# Patient Record
Sex: FEMALE | Race: WHITE | Employment: UNEMPLOYED | URBAN - METROPOLITAN AREA
[De-identification: names, ages, dates, MRNs, and addresses within clinical notes are randomized per-mention and may not be internally consistent; named-entity substitution may affect disease eponyms.]

---

## 2021-06-02 ENCOUNTER — HOSPITAL ENCOUNTER (OUTPATIENT)
Dept: LAB | Age: 51
Discharge: HOME | End: 2021-06-02
Payer: OTHER GOVERNMENT

## 2021-06-02 ENCOUNTER — OFFICE VISIT (OUTPATIENT)
Dept: HEMATOLOGY | Age: 51
End: 2021-06-02
Payer: OTHER GOVERNMENT

## 2021-06-02 VITALS
OXYGEN SATURATION: 98 % | HEART RATE: 89 BPM | SYSTOLIC BLOOD PRESSURE: 124 MMHG | TEMPERATURE: 97.8 F | HEIGHT: 64 IN | DIASTOLIC BLOOD PRESSURE: 84 MMHG

## 2021-06-02 DIAGNOSIS — R74.8 ELEVATED LIVER ENZYMES: ICD-10-CM

## 2021-06-02 DIAGNOSIS — R74.8 ELEVATED LIVER ENZYMES: Primary | ICD-10-CM

## 2021-06-02 LAB
ALBUMIN SERPL-MCNC: 4.3 G/DL (ref 3.4–5)
ALBUMIN/GLOB SERPL: 1.2 {RATIO} (ref 0.8–1.7)
ALP SERPL-CCNC: 196 U/L (ref 45–117)
ALT SERPL-CCNC: 63 U/L (ref 13–56)
ANION GAP SERPL CALC-SCNC: 7 MMOL/L (ref 3–18)
AST SERPL-CCNC: 24 U/L (ref 10–38)
BASOPHILS # BLD: 0 K/UL (ref 0–0.1)
BASOPHILS NFR BLD: 1 % (ref 0–2)
BILIRUB DIRECT SERPL-MCNC: 0.1 MG/DL (ref 0–0.2)
BILIRUB SERPL-MCNC: 0.4 MG/DL (ref 0.2–1)
BUN SERPL-MCNC: 7 MG/DL (ref 7–18)
BUN/CREAT SERPL: 10 (ref 12–20)
CALCIUM SERPL-MCNC: 9.6 MG/DL (ref 8.5–10.1)
CHLORIDE SERPL-SCNC: 104 MMOL/L (ref 100–111)
CO2 SERPL-SCNC: 29 MMOL/L (ref 21–32)
CREAT SERPL-MCNC: 0.68 MG/DL (ref 0.6–1.3)
DIFFERENTIAL METHOD BLD: ABNORMAL
EOSINOPHIL # BLD: 0.1 K/UL (ref 0–0.4)
EOSINOPHIL NFR BLD: 3 % (ref 0–5)
ERYTHROCYTE [DISTWIDTH] IN BLOOD BY AUTOMATED COUNT: 13.5 % (ref 11.6–14.5)
FERRITIN SERPL-MCNC: 33 NG/ML (ref 8–388)
GLOBULIN SER CALC-MCNC: 3.5 G/DL (ref 2–4)
GLUCOSE SERPL-MCNC: 86 MG/DL (ref 74–99)
HCT VFR BLD AUTO: 43 % (ref 35–45)
HGB BLD-MCNC: 13.7 G/DL (ref 12–16)
INR PPP: 1 (ref 0.8–1.2)
IRON SATN MFR SERPL: 13 % (ref 20–50)
IRON SERPL-MCNC: 58 UG/DL (ref 50–175)
LYMPHOCYTES # BLD: 1.7 K/UL (ref 0.9–3.6)
LYMPHOCYTES NFR BLD: 41 % (ref 21–52)
MCH RBC QN AUTO: 29.5 PG (ref 24–34)
MCHC RBC AUTO-ENTMCNC: 31.9 G/DL (ref 31–37)
MCV RBC AUTO: 92.5 FL (ref 74–97)
MONOCYTES # BLD: 0.3 K/UL (ref 0.05–1.2)
MONOCYTES NFR BLD: 8 % (ref 3–10)
NEUTS SEG # BLD: 2 K/UL (ref 1.8–8)
NEUTS SEG NFR BLD: 47 % (ref 40–73)
PLATELET # BLD AUTO: 366 K/UL (ref 135–420)
PMV BLD AUTO: 10.3 FL (ref 9.2–11.8)
POTASSIUM SERPL-SCNC: 3.7 MMOL/L (ref 3.5–5.5)
PROT SERPL-MCNC: 7.8 G/DL (ref 6.4–8.2)
PROTHROMBIN TIME: 13 SEC (ref 11.5–15.2)
RBC # BLD AUTO: 4.65 M/UL (ref 4.2–5.3)
SODIUM SERPL-SCNC: 140 MMOL/L (ref 136–145)
TIBC SERPL-MCNC: 445 UG/DL (ref 250–450)
WBC # BLD AUTO: 4.2 K/UL (ref 4.6–13.2)

## 2021-06-02 PROCEDURE — 99204 OFFICE O/P NEW MOD 45 MIN: CPT | Performed by: INTERNAL MEDICINE

## 2021-06-02 PROCEDURE — 80048 BASIC METABOLIC PNL TOTAL CA: CPT

## 2021-06-02 PROCEDURE — 83516 IMMUNOASSAY NONANTIBODY: CPT

## 2021-06-02 PROCEDURE — 86256 FLUORESCENT ANTIBODY TITER: CPT

## 2021-06-02 PROCEDURE — 82390 ASSAY OF CERULOPLASMIN: CPT

## 2021-06-02 PROCEDURE — 83540 ASSAY OF IRON: CPT

## 2021-06-02 PROCEDURE — 80076 HEPATIC FUNCTION PANEL: CPT

## 2021-06-02 PROCEDURE — 85610 PROTHROMBIN TIME: CPT

## 2021-06-02 PROCEDURE — 36415 COLL VENOUS BLD VENIPUNCTURE: CPT

## 2021-06-02 PROCEDURE — 82103 ALPHA-1-ANTITRYPSIN TOTAL: CPT

## 2021-06-02 PROCEDURE — 85025 COMPLETE CBC W/AUTO DIFF WBC: CPT

## 2021-06-02 PROCEDURE — 86038 ANTINUCLEAR ANTIBODIES: CPT

## 2021-06-02 PROCEDURE — 82728 ASSAY OF FERRITIN: CPT

## 2021-06-02 RX ORDER — DEXTROAMPHETAMINE SACCHARATE, AMPHETAMINE ASPARTATE, DEXTROAMPHETAMINE SULFATE AND AMPHETAMINE SULFATE 5; 5; 5; 5 MG/1; MG/1; MG/1; MG/1
20 TABLET ORAL
COMMUNITY
End: 2021-10-13

## 2021-06-02 RX ORDER — TRAZODONE HYDROCHLORIDE 50 MG/1
TABLET ORAL
COMMUNITY

## 2021-06-02 RX ORDER — OMEPRAZOLE 40 MG/1
40 CAPSULE, DELAYED RELEASE ORAL DAILY
COMMUNITY

## 2021-06-02 RX ORDER — HYDROXYCHLOROQUINE SULFATE 200 MG/1
200 TABLET, FILM COATED ORAL DAILY
COMMUNITY

## 2021-06-02 NOTE — Clinical Note
6/12/2021 Patient: Kendal Meza YOB: 1970 Date of Visit: 6/2/2021 [address]  [phone] [phone] Via Fax: [phone] Dear Nicol Ray PA-C, Thank you for referring Ms. Kendal Meza to [address] for evaluation. My notes for this consultation are attached. If you have questions, please do not hesitate to call me. I look forward to following your patient along with you. Sincerely, Ladi Sebastian MD

## 2021-06-02 NOTE — PROGRESS NOTES
Karynaue Dill [address]      Gregor Schmitz MD, Therese, Saman Dubon, Sybil Cummins MD, MPH      Blake Núñez, PA-ALEX Jeff, [facility]     Melanie Parsons, [facility]   Silvia Shaw, P-C    Rui Longo, [facility]       [facility] [address]    at [address], [address] [place] [address], [facility] 22.    [phone]    FAX: [address] [place] [address]    at [facility]    [address], [address], [address]    [phone]    FAX: [phone]         Patient Care Team:  Carlos Guillen as PCP - General (Physician Assistant)  Annie West MD (Gastroenterology)      Problem List  Date Reviewed: 6/12/2021        Codes Class Noted    Elevated liver enzymes ICD-10-CM: R74.8  ICD-9-CM: 790.5  6/12/2021        Fibromyalgia ICD-10-CM: M79.7  ICD-9-CM: 729.1  6/12/2021        Systemic lupus ([facility] [address] ICD-10-CM: M32.9  ICD-9-CM: 710.0  6/12/2021        ADD (attention deficit disorder) ICD-10-CM: F98.8  ICD-9-CM: 314.00  6/12/2021        GERD (gastroesophageal reflux disease) ICD-10-CM: K21.9  ICD-9-CM: 530.81  6/12/2021        History of cholecystectomy ICD-10-CM: Z90.49  ICD-9-CM: V45.79  6/12/2021        H/O lumbosacral spine surgery ICD-10-CM: Z98.890  ICD-9-CM: V15.29  6/12/2021              The clinicians listed above have asked me to see Susan Posey in consultation regarding elevated liver enzymes and its management. All medical records sent by the referring physicians were reviewed including imaging studies and pathology. The patient is a 44 y.o. female who was found to have elevated liver transaminases in 11/2020.       Serologic evaluation for markers of chronic liver disease was positive or CARLENE    Imaging of the liver   was either not performed or the results are not available to me.    was not performed. CT scan of the liver was performed in 2/2021. The results of the imaging demonstrated a normal appearing liver. A liver biopsy was performed in 3/2021. This demonstrated portal inflammation. The degree of fibrosis is not reported      The patient has the following symptoms which could be attributed to the liver disorder:    fatigue,   nausea,   arthralgias,   myalgias,     The patient is not experiencing the following symptoms which are commonly seen in this liver disorder:   vomiting,   swelling of the abdomen,   swelling of the lower extremities,   hematemesis,   hematochezia. The patient has Severe limitations in functional activities which can be attributed to other medical problems that are not related to the liver disease. ASSESSMENT AND PLAN:  Elevated liver enzymes  Persistent elevation in liver transaminases of unclear etiology at this time. ALP is normal.  Liver function is normal.  The platelet count is normal.      Serologic testing for causes of chronic liver disease was negative     Will perform laboratory testing to monitor liver function and degree of liver injury. This included BMP, hepatic panel, CBC with platelet count, INR. The patient had a liver biopsy   Will request that the liver biopsy slides sent be to me for my personal review. Screening for Hepatocellular Carcinoma  HCC screening is not necessary if the patient has no evidence of cirrhosis. Treatment of other medical problems in patients with chronic liver disease  There are no contraindications for the patient to take most medications that are necessary for treatment of other medical issues. Counseling for alcohol in patients with chronic liver disease  The patient was counseled regarding alcohol consumption and the effect of alcohol on chronic liver disease. The patient does not consume any significant amount of alcohol.     Vaccinations   The need for vaccination against viral hepatitis A and B will be assessed with serologic and instituted as appropriate. Routine vaccinations against other bacterial and viral agents can be performed as indicated. Annual flu vaccination should be administered if indicated. ALLERGIES  Allergies   Allergen Reactions    Ampicillin Nausea and Vomiting    Azithromycin Nausea and Vomiting    Demerol [Meperidine] Hives    Tramadol Hives       MEDICATIONS  Current Outpatient Medications   Medication Sig    omeprazole (PRILOSEC) 40 mg capsule Take 40 mg by mouth daily.  hydrOXYchloroQUINE (PLAQUENIL) 200 mg tablet Take 200 mg by mouth daily.  traZODone (DESYREL) 50 mg tablet Take  by mouth nightly.  dextroamphetamine-amphetamine (AdderalL) 20 mg tablet Take 20 mg by mouth. No current facility-administered medications for this visit. SYSTEM REVIEW NOT RELATED TO LIVER DISEASE OR REVIEWED ABOVE:  Constitution systems: Negative for fever, chills, weight gain, weight loss. Eyes: Negative for visual changes. ENT: Negative for sore throat, painful swallowing. Respiratory: Negative for cough, hemoptysis, SOB. Cardiology: Negative for chest pain, palpitations. GI:  Negative for constipation or diarrhea. : Negative for urinary frequency, dysuria, hematuria, nocturia. Skin: Negative for rash. Hematology: Negative for easy bruising, blood clots. Musculo-skelatal: Negative for back pain, muscle pain, weakness. Neurologic: Negative for headaches, dizziness, vertigo, memory problems not related to HE. Psychology: Negative for anxiety, depression. FAMILY HISTORY:  The father  of Agent [place]. The mother Has/had the following chronic disease(s): NOne. There is no family history of liver disease. SOCIAL HISTORY:  The patient is . The patient has 3 children,   The patient stopped using tobacco products in . The patient does not work outside the home.         PHYSICAL EXAMINATION:  Visit Vitals  /84   Pulse 89   Temp 97.8 °F (36.6 °C) (Tympanic)   Ht 5' 4\" (1.626 m)   SpO2 98%     General: No acute distress. Eyes: Sclera anicteric. ENT: No oral lesions. Thyroid normal.  Nodes: No adenopathy. Skin: No spider angiomata. No jaundice. No palmar erythema. Respiratory: Lungs clear to auscultation. Cardiovascular: Regular heart rate. No murmurs. No JVD. Abdomen: Soft non-tender. Liver size normal to percussion/palpation. Spleen not palpable. No obvious ascites. Extremities: No edema. No muscle wasting. No gross arthritic changes. Neurologic: Alert and oriented. Cranial nerves grossly intact. No asterixis. LABORATORY STUDIES:  Liver [place] of [address] Units 6/2/2021   WBC 4.6 - 13.2 K/uL 4.2 (L)   ANC 1.8 - 8.0 K/UL 2.0   HGB 12.0 - 16.0 g/dL 13.7    - 420 K/uL 366   INR 0.8 - 1.2   1.0   AST 10 - 38 U/L 24   ALT 13 - 56 U/L 63 (H)   Alk Phos 45 - 117 U/L 196 (H)   Bili, Total 0.2 - 1.0 MG/DL 0.4   Bili, Direct 0.0 - 0.2 MG/DL 0.1   Albumin 3.4 - 5.0 g/dL 4.3   BUN 7.0 - 18 MG/DL 7   Creat 0.6 - 1.3 MG/DL 0.68   Na 136 - 145 mmol/L 140   K 3.5 - 5.5 mmol/L 3.7   Cl 100 - 111 mmol/L 104   CO2 21 - 32 mmol/L 29   Glucose 74 - 99 mg/dL 86     SEROLOGIES:  11/2020. HBsAntigen negative, anti-HCV negative, CARLENE positive,    Serologies Latest Ref Rng & Units 6/2/2021   Ferritin 8 - 388 NG/ML 33   Iron % Saturation 20 - 50 % 13 (L)   CARLENE, IFA  Negative   C-ANCA Neg:<1:20 titer <1:20   P-ANCA Neg:<1:20 titer <1:20   ANCA Neg:<1:20 titer <1:20   ASMCA 0 - 19 Units 8   M2 Ab 0.0 - 20.0 Units <20.0   Ceruloplasmin 19.0 - 39.0 mg/dL 29.7   Alpha-1 antitrypsin level 101 - 187 mg/dL 130     LIVER HISTOLOGY:  Not available or performed    ENDOSCOPIC PROCEDURES:  Not available or performed    RADIOLOGY:  2/2021. CT scan abdomen with IV contrast.  Normal appearing liver. No liver mass lesions. Normal spleen. No ascites.     OTHER TESTING:  Not available or performed    FOLLOW-UP:  All of the issues listed above in the Assessment and Plan were discussed with the patient. All questions were answered. The patient expressed a clear understanding of the above. [address] [address] in 4 weeks for Fibroscan to review all data and determine the treatment plan.       El Ott MD  [address] [facility]  4 [facility], [address] [place] [address] Yanira George, [address] [facility] [address]  [address] [place] [address]

## 2021-06-03 LAB
A1AT SERPL-MCNC: 130 MG/DL (ref 101–187)
ACTIN IGG SERPL-ACNC: 8 UNITS (ref 0–19)
ANA TITR SER IF: NEGATIVE {TITER}
CERULOPLASMIN SERPL-MCNC: 29.7 MG/DL (ref 19–39)
MITOCHONDRIA M2 IGG SER-ACNC: <20 UNITS (ref 0–20)

## 2021-06-04 LAB
C-ANCA TITR SER IF: NORMAL TITER
P-ANCA ATYPICAL TITR SER IF: NORMAL TITER
P-ANCA TITR SER IF: NORMAL TITER

## 2021-06-12 PROBLEM — K21.9 GERD (GASTROESOPHAGEAL REFLUX DISEASE): Status: ACTIVE | Noted: 2021-06-12

## 2021-06-12 PROBLEM — M32.9 SYSTEMIC LUPUS (HCC): Status: ACTIVE | Noted: 2021-06-12

## 2021-06-12 PROBLEM — M79.7 FIBROMYALGIA: Status: ACTIVE | Noted: 2021-06-12

## 2021-06-12 PROBLEM — F98.8 ADD (ATTENTION DEFICIT DISORDER): Status: ACTIVE | Noted: 2021-06-12

## 2021-06-12 PROBLEM — R74.8 ELEVATED LIVER ENZYMES: Status: ACTIVE | Noted: 2021-06-12

## 2021-06-12 PROBLEM — Z90.49 HISTORY OF CHOLECYSTECTOMY: Status: ACTIVE | Noted: 2021-06-12

## 2021-06-12 PROBLEM — Z98.890 H/O LUMBOSACRAL SPINE SURGERY: Status: ACTIVE | Noted: 2021-06-12

## 2021-09-22 ENCOUNTER — OFFICE VISIT (OUTPATIENT)
Dept: HEMATOLOGY | Age: 51
End: 2021-09-22
Payer: OTHER GOVERNMENT

## 2021-09-22 VITALS
HEART RATE: 63 BPM | DIASTOLIC BLOOD PRESSURE: 63 MMHG | BODY MASS INDEX: 26.16 KG/M2 | OXYGEN SATURATION: 96 % | SYSTOLIC BLOOD PRESSURE: 106 MMHG | TEMPERATURE: 97.4 F | WEIGHT: 152.38 LBS

## 2021-09-22 DIAGNOSIS — K75.4 AUTOIMMUNE HEPATITIS (HCC): Primary | ICD-10-CM

## 2021-09-22 PROCEDURE — 99214 OFFICE O/P EST MOD 30 MIN: CPT | Performed by: INTERNAL MEDICINE

## 2021-09-22 RX ORDER — BUPRENORPHINE 5 UG/H
PATCH TRANSDERMAL
COMMUNITY

## 2021-09-22 NOTE — Clinical Note
10/13/2021    Patient: Graima Germain   YOB: 1970   Date of Visit: 9/22/2021     Niya Lopez [address] [facility] [address]  Via Fax: [phone]    Dear Eusebio Perez PA-C,      Thank you for referring Ms. Garima Germain to [address] [place] [address],[address] for evaluation. My notes for this consultation are attached. If you have questions, please do not hesitate to call me. I look forward to following your patient along with you.       Sincerely,    Yonas Bloom MD

## 2021-09-22 NOTE — PROGRESS NOTES
Sandor Flores MD, [address], [address] Whitney Dubon, Van Chairez MD, MPH      APOLINAR Carbajal, ACNP-BC     Melanie Parsons, [facility]   David Councilman, FNP-C    Ani Matias, [facility]       [facility]ile [facility] [address] [place] [address]    at [address], [address] [place] [address], [facility] 22.    [phone]    FAX: [address] [place] [address] Avenue    [facility]    [address], [address]    [address] [facility], [address]    [phone]    FAX: [phone]         Patient Care Team:  Alexx Medina as PCP - General (Physician Assistant)  Emanuel Kerr MD (Gastroenterology)      Problem List  Date Reviewed: 6/12/2021        Codes Class Noted    Elevated liver enzymes ICD-10-CM: R74.8  ICD-9-CM: 790.5  6/12/2021        Fibromyalgia ICD-10-CM: M79.7  ICD-9-CM: 729.1  6/12/2021        Systemic lupus ([address] ICD-10-CM: M32.9  ICD-9-CM: 710.0  6/12/2021        ADD (attention deficit disorder) ICD-10-CM: F98.8  ICD-9-CM: 314.00  6/12/2021        GERD (gastroesophageal reflux disease) ICD-10-CM: K21.9  ICD-9-CM: 530.81  6/12/2021        History of cholecystectomy ICD-10-CM: Z90.49  ICD-9-CM: V45.79  6/12/2021        H/O lumbosacral spine surgery ICD-10-CM: Z98.890  ICD-9-CM: V15.29  6/12/2021                Marisol Youssef is being seen at [address] for management of elevated liver enzymes. The active problem list, all pertinent past medical history, medications, radiologic findings and laboratory findings related to the liver disorder were reviewed and discussed with the patient. The patient is a 44 y.o. female who was found to have elevated liver transaminases in 11/2020.       Serologic evaluation for markers of chronic liver disease was positive or CARLENE    CT scan of the liver was performed in 2/2021. The results of the imaging demonstrated a normal appearing liver. Since the last appointment I have since received, reviewed and interpreted the patient's liver biopsy slides from Denzel HAYNES performed in 3/2021. This demonstrates mild portal inflammation, no interface inflammation, no PMN, mild lobular inflammation, normal bile ducts, no fibrosis. Assessment of liver fibrosis with Fibroscan was performed in the office today. The result was 3.4 kPa which correlates with no fibrosis. The patient has the following symptoms which could be attributed to the liver disorder:    fatigue,   nausea,   arthralgias,   myalgias,     The patient is not experiencing the following symptoms which are commonly seen in this liver disorder:   vomiting,   swelling of the abdomen,   swelling of the lower extremities,   hematemesis,   hematochezia. The patient has Severe limitations in functional activities which can be attributed to other medical problems that are not related to the liver disease. ASSESSMENT AND PLAN:  SLE Associated Hepatitis   The diagnosis was based upon laboratory studies, serology, liver biopsy, other coexistant autoimmune disorders    A liver biopsy performed in 3/2021 shows mild inflammation, no interface hepatitis, no piecemeal necrosis, normal bile ducts, mild lobular inflammation and no fibrosis    Fibroscan performed in 9/2021 demonstrated a liver stiffness of 3.4 consistent with no fibrosis    The patient is not currently receiving treatment for the liver disease. Autoimmune associated hepatitis can be seen in many rheumatologic disorders. As opposed to true autoimmune hepatitis the autoimmune associated hepatitis is always mild, does not cause fibrosis and in the majority of cases does not progress to cirrhosis.         Liver transaminases are normal.  ALP is normal.  Liver function is normal.  The platelet count is   normal.      The Fibroscan can be repeated annually or as often as clinically indicated to assess for fibrosis progression and/or regression. Screening for Hepatocellular Carcinoma  HCC screening is not necessary if the patient has no evidence of cirrhosis. Treatment of other medical problems in patients with chronic liver disease  There are no contraindications for the patient to take most medications that are necessary for treatment of other medical issues. Counseling for alcohol in patients with chronic liver disease  The patient was counseled regarding alcohol consumption and the effect of alcohol on chronic liver disease. The patient does not consume any significant amount of alcohol. Vaccinations   The need for vaccination against viral hepatitis A and B will be assessed with serologic and instituted as appropriate. Routine vaccinations against other bacterial and viral agents can be performed as indicated. Annual flu vaccination should be administered if indicated. ALLERGIES  Allergies   Allergen Reactions    Ampicillin Nausea and Vomiting    Azithromycin Nausea and Vomiting    Demerol [Meperidine] Hives    Tramadol Hives       MEDICATIONS  Current Outpatient Medications   Medication Sig    buprenorphine (BUTRANS) 5 mcg/hour patch buprenorphine 5 mcg/hour weekly transdermal patch   Apply 1 patch every week by transdermal route for 30 days.  omeprazole (PRILOSEC) 40 mg capsule Take 40 mg by mouth daily.  hydrOXYchloroQUINE (PLAQUENIL) 200 mg tablet Take 200 mg by mouth daily.  traZODone (DESYREL) 50 mg tablet Take  by mouth nightly.  dextroamphetamine-amphetamine (AdderalL) 20 mg tablet Take 20 mg by mouth. (Patient not taking: Reported on 9/22/2021)     No current facility-administered medications for this visit. SYSTEM REVIEW NOT RELATED TO LIVER DISEASE OR REVIEWED ABOVE:  Constitution systems: Negative for fever, chills, weight gain, weight loss. Eyes: Negative for visual changes.   ENT: Negative for sore throat, painful swallowing. Respiratory: Negative for cough, hemoptysis, SOB. Cardiology: Negative for chest pain, palpitations. GI:  Negative for constipation or diarrhea. : Negative for urinary frequency, dysuria, hematuria, nocturia. Skin: Negative for rash. Hematology: Negative for easy bruising, blood clots. Musculo-skelatal: Negative for back pain, muscle pain, weakness. Neurologic: Negative for headaches, dizziness, vertigo, memory problems not related to HE. Psychology: Negative for anxiety, depression. FAMILY HISTORY:  The father  of Agent [place]. The mother Has/had the following chronic disease(s): NOne. There is no family history of liver disease. SOCIAL HISTORY:  The patient is . The patient has 3 children,   The patient stopped using tobacco products in . The patient does not work outside the home. PHYSICAL EXAMINATION:  Visit Vitals  /63   Pulse 63   Temp 97.4 °F (36.3 °C) (Tympanic)   Wt 152 lb 6 oz (69.1 kg)   SpO2 96%   BMI 26.16 kg/m²     General: No acute distress. Eyes: Sclera anicteric. ENT: No oral lesions. Thyroid normal.  Nodes: No adenopathy. Skin: No spider angiomata. No jaundice. No palmar erythema. Respiratory: Lungs clear to auscultation. Cardiovascular: Regular heart rate. No murmurs. No JVD. Abdomen: Soft non-tender. Liver size normal to percussion/palpation. Spleen not palpable. No obvious ascites. Extremities: No edema. No muscle wasting. No gross arthritic changes. Neurologic: Alert and oriented. Cranial nerves grossly intact. No asterixis.     LABORATORY STUDIES:  Liver [place] of [address] Units 2021   WBC 4.6 - 13.2 K/uL 4.2 (L)   ANC 1.8 - 8.0 K/UL 2.0   HGB 12.0 - 16.0 g/dL 13.7    - 420 K/uL 366   INR 0.8 - 1.2   1.0   AST 10 - 38 U/L 24   ALT 13 - 56 U/L 63 (H)   Alk Phos 45 - 117 U/L 196 (H)   Bili, Total 0.2 - 1.0 MG/DL 0.4   Bili, Direct 0.0 - 0.2 MG/DL 0.1 Albumin 3.4 - 5.0 g/dL 4.3   BUN 7.0 - 18 MG/DL 7   Creat 0.6 - 1.3 MG/DL 0.68   Na 136 - 145 mmol/L 140   K 3.5 - 5.5 mmol/L 3.7   Cl 100 - 111 mmol/L 104   CO2 21 - 32 mmol/L 29   Glucose 74 - 99 mg/dL 86     SEROLOGIES:  11/2020. HBsAntigen negative, anti-HCV negative, CARLENE positive,    Serologies Latest Ref Rng & Units 6/2/2021   Ferritin 8 - 388 NG/ML 33   Iron % Saturation 20 - 50 % 13 (L)   CARLENE, IFA  Negative   C-ANCA Neg:<1:20 titer <1:20   P-ANCA Neg:<1:20 titer <1:20   ANCA Neg:<1:20 titer <1:20   ASMCA 0 - 19 Units 8   M2 Ab 0.0 - 20.0 Units <20.0   Ceruloplasmin 19.0 - 39.0 mg/dL 29.7   Alpha-1 antitrypsin level 101 - 187 mg/dL 130     LIVER HISTOLOGY:  3/2021. Slides reviewed by MLS. MIld portal inflammation, with no interface hepatitis, with no PMN, normal bile ducts, mild lobular inflammation. no steatosis. Cheryl stage 1 fibrosis. Metavir stage 0 fibrosis. Knodell score (0110). 9/2021. FibroScan performed at [address]. EkPa was 3.4. IQR/med 27%. . The results suggested a fibrosis level of F0. The CAP score suggests there is no significant hepatic steatosis. ENDOSCOPIC PROCEDURES:  Not available or performed    RADIOLOGY:  2/2021. CT scan abdomen with IV contrast.  Normal appearing liver. No liver mass lesions. Normal spleen. No ascites. OTHER TESTING:  Not available or performed    FOLLOW-UP:  All of the issues listed above in the Assessment and Plan were discussed with the patient. All questions were answered. The patient expressed a clear understanding of the above. [address] [address] in 6 months to review all data and determine the treatment plan.       Juliann Swan MD  [address] [address], [address] [place] [address], [address]  [address] [place] [address]

## 2021-10-22 PROBLEM — M48.061 LUMBAR STENOSIS: Status: ACTIVE | Noted: 2021-10-22

## 2022-03-18 PROBLEM — F98.8 ADD (ATTENTION DEFICIT DISORDER): Status: ACTIVE | Noted: 2021-06-12

## 2022-03-18 PROBLEM — M79.7 FIBROMYALGIA: Status: ACTIVE | Noted: 2021-06-12

## 2022-03-19 PROBLEM — M48.061 LUMBAR STENOSIS: Status: ACTIVE | Noted: 2021-10-22

## 2022-03-19 PROBLEM — Z90.49 HISTORY OF CHOLECYSTECTOMY: Status: ACTIVE | Noted: 2021-06-12

## 2022-03-19 PROBLEM — R74.8 ELEVATED LIVER ENZYMES: Status: ACTIVE | Noted: 2021-06-12

## 2022-03-19 PROBLEM — K21.9 GERD (GASTROESOPHAGEAL REFLUX DISEASE): Status: ACTIVE | Noted: 2021-06-12

## 2022-03-20 PROBLEM — Z98.890 H/O LUMBOSACRAL SPINE SURGERY: Status: ACTIVE | Noted: 2021-06-12

## 2022-03-20 PROBLEM — M32.9 SYSTEMIC LUPUS (HCC): Status: ACTIVE | Noted: 2021-06-12

## 2023-10-04 PROBLEM — M51.26 HNP (HERNIATED NUCLEUS PULPOSUS), LUMBAR: Status: ACTIVE | Noted: 2023-10-04

## 2024-06-03 ENCOUNTER — TELEPHONE (OUTPATIENT)
Age: 54
End: 2024-06-03

## 2024-06-20 PROBLEM — K85.90 POST-ERCP ACUTE PANCREATITIS: Status: ACTIVE | Noted: 2024-06-20

## 2024-06-20 PROBLEM — K91.89 POST-ERCP ACUTE PANCREATITIS: Status: ACTIVE | Noted: 2024-06-20

## 2024-08-06 RX ORDER — PREGABALIN 75 MG/1
75 CAPSULE ORAL 2 TIMES DAILY
COMMUNITY
Start: 2022-04-27

## 2024-08-06 RX ORDER — CODEINE PHOSPHATE AND GUAIFENESIN 10; 100 MG/5ML; MG/5ML
SOLUTION ORAL
COMMUNITY
Start: 2024-05-07 | End: 2024-09-01

## 2024-08-06 RX ORDER — ONDANSETRON 4 MG/1
4 TABLET, FILM COATED ORAL EVERY 8 HOURS PRN
COMMUNITY
Start: 2023-08-08

## 2024-08-06 RX ORDER — ONDANSETRON 4 MG/1
4 TABLET, ORALLY DISINTEGRATING ORAL EVERY 8 HOURS PRN
COMMUNITY
Start: 2024-07-10

## 2024-08-06 RX ORDER — DEXTROMETHORPHAN HYDROBROMIDE AND PROMETHAZINE HYDROCHLORIDE 15; 6.25 MG/5ML; MG/5ML
SYRUP ORAL
COMMUNITY
Start: 2024-04-23 | End: 2024-09-01

## 2024-08-06 RX ORDER — FLUTICASONE PROPIONATE 110 UG/1
2 AEROSOL, METERED RESPIRATORY (INHALATION) 2 TIMES DAILY
COMMUNITY
Start: 2023-01-10 | End: 2024-09-01

## 2024-08-06 RX ORDER — PREDNISONE 50 MG/1
TABLET ORAL
COMMUNITY
Start: 2024-05-07 | End: 2024-09-01

## 2024-08-06 RX ORDER — HYDROXYZINE HYDROCHLORIDE 25 MG/1
TABLET, FILM COATED ORAL
COMMUNITY
Start: 2023-11-13 | End: 2024-09-01

## 2024-08-06 RX ORDER — FLUOCINOLONE ACETONIDE 0.11 MG/ML
1 OIL AURICULAR (OTIC) 2 TIMES DAILY
COMMUNITY
Start: 2021-12-30 | End: 2024-09-01

## 2024-08-06 RX ORDER — LIDOCAINE 50 MG/G
PATCH TOPICAL
COMMUNITY
Start: 2020-06-04 | End: 2024-09-01

## 2024-08-06 RX ORDER — OXYCODONE HYDROCHLORIDE 5 MG/1
TABLET ORAL
COMMUNITY
Start: 2024-05-23 | End: 2024-09-01

## 2024-08-06 RX ORDER — MELOXICAM 15 MG/1
TABLET ORAL
COMMUNITY
Start: 2024-03-19 | End: 2024-09-01

## 2024-08-06 RX ORDER — PREDNISONE 20 MG/1
TABLET ORAL
COMMUNITY
Start: 2024-04-23 | End: 2024-09-01

## 2024-08-06 RX ORDER — KETOCONAZOLE 20 MG/G
1 CREAM TOPICAL DAILY
COMMUNITY
Start: 2023-09-26 | End: 2024-09-01

## 2024-08-06 RX ORDER — SODIUM, POTASSIUM,MAG SULFATES 17.5-3.13G
SOLUTION, RECONSTITUTED, ORAL ORAL
COMMUNITY
End: 2024-09-01

## 2024-08-06 RX ORDER — METHYLPHENIDATE HYDROCHLORIDE 18 MG/1
18 TABLET, EXTENDED RELEASE ORAL DAILY
COMMUNITY
Start: 2022-08-23 | End: 2024-09-01

## 2024-08-07 ENCOUNTER — OFFICE VISIT (OUTPATIENT)
Age: 54
End: 2024-08-07
Payer: MEDICARE

## 2024-08-07 VITALS
WEIGHT: 142 LBS | HEIGHT: 64 IN | OXYGEN SATURATION: 96 % | SYSTOLIC BLOOD PRESSURE: 109 MMHG | HEART RATE: 76 BPM | TEMPERATURE: 97.1 F | DIASTOLIC BLOOD PRESSURE: 74 MMHG | BODY MASS INDEX: 24.24 KG/M2

## 2024-08-07 DIAGNOSIS — K75.4 AUTOIMMUNE HEPATITIS (HCC): Primary | ICD-10-CM

## 2024-08-07 PROCEDURE — G8420 CALC BMI NORM PARAMETERS: HCPCS | Performed by: INTERNAL MEDICINE

## 2024-08-07 PROCEDURE — 3017F COLORECTAL CA SCREEN DOC REV: CPT | Performed by: INTERNAL MEDICINE

## 2024-08-07 PROCEDURE — 99214 OFFICE O/P EST MOD 30 MIN: CPT | Performed by: INTERNAL MEDICINE

## 2024-08-07 PROCEDURE — G8427 DOCREV CUR MEDS BY ELIG CLIN: HCPCS | Performed by: INTERNAL MEDICINE

## 2024-08-07 PROCEDURE — 1036F TOBACCO NON-USER: CPT | Performed by: INTERNAL MEDICINE

## 2024-08-07 PROCEDURE — 91200 LIVER ELASTOGRAPHY: CPT | Performed by: INTERNAL MEDICINE

## 2024-08-07 RX ORDER — PREDNISONE 5 MG/1
TABLET ORAL
COMMUNITY
Start: 2024-06-24 | End: 2024-09-01

## 2024-08-07 RX ORDER — METHOCARBAMOL 500 MG/1
TABLET, FILM COATED ORAL
COMMUNITY
Start: 2024-07-11 | End: 2024-09-01

## 2024-08-07 NOTE — PROGRESS NOTES
[facility]      Rell Torres MD, FACP, FACG, FAASLD    Armando Conner MD, MPH      TUTU Masterson, [facility]-BC     April S Gumaro, [facility]   Narendra Choudhury FNP-C    Michelle Evans, [facility]    at [facility]    [address] [facility], [address]    [place]  [address] [phone]    FAX: [phone]   [facility]    at [facility]    [address] [facility], [address]    [place]  [address] [phone]    FAX: [phone]       Patient Care Team:  Nia Baires PA-C as PCP - General (Physician Assistant)  Kareen Quinones MD (Gastroenterology)      Patient Active Problem List   Diagnosis    Fibromyalgia    ADD (attention deficit disorder)    Lumbar stenosis    History of cholecystectomy    Elevated liver enzymes    GERD (gastroesophageal reflux disease)    Systemic lupus (HCC)    H/O lumbosacral spine surgery    HNP (herniated nucleus pulposus), lumbar    Post-ERCP acute pancreatitis       Carmen Joy is being seen at [facility] for management of autoimmune associated hepatitis.      The active problem list, all pertinent past medical history, medications, radiologic findings and laboratory findings related to the liver disorder were reviewed and discussed with the patient.      This is the first appointment at [facility] since 9/2021.    The patient is a 50 y.o. female who was found to have elevated liver transaminases in 11/2020.    The patient has a diagnosis of SLE and antibodies that are strongly positive for Sjogrens syndrome.      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    Serologic evaluation for markers of chronic liver disease was positive or